# Patient Record
Sex: FEMALE | Race: WHITE | NOT HISPANIC OR LATINO
[De-identification: names, ages, dates, MRNs, and addresses within clinical notes are randomized per-mention and may not be internally consistent; named-entity substitution may affect disease eponyms.]

---

## 2019-10-07 ENCOUNTER — APPOINTMENT (OUTPATIENT)
Dept: ORTHOPEDIC SURGERY | Facility: CLINIC | Age: 24
End: 2019-10-07
Payer: COMMERCIAL

## 2019-10-07 VITALS — HEIGHT: 62 IN | WEIGHT: 120 LBS | BODY MASS INDEX: 22.08 KG/M2

## 2019-10-07 DIAGNOSIS — M23.91 UNSPECIFIED INTERNAL DERANGEMENT OF RIGHT KNEE: ICD-10-CM

## 2019-10-07 PROBLEM — Z00.00 ENCOUNTER FOR PREVENTIVE HEALTH EXAMINATION: Status: ACTIVE | Noted: 2019-10-07

## 2019-10-07 PROCEDURE — 99204 OFFICE O/P NEW MOD 45 MIN: CPT

## 2019-10-07 PROCEDURE — 73562 X-RAY EXAM OF KNEE 3: CPT | Mod: RT

## 2019-10-07 NOTE — DISCUSSION/SUMMARY
[de-identified] : This patient has a patellofemoral syndrome of her right knee due to the external rotation of her right hip. I recommend that she stretch and consider physical therapy she'll use ice she'll give it time if it doesn't get better she'll let me know it may take 6 or 8 weeks.

## 2019-10-07 NOTE — HISTORY OF PRESENT ILLNESS
[de-identified] : Location: right knee\par Quality:  sharp\par Duration: 10/01/2019\par Context: Atraumatic\par Aggravating Factors: walking, weightbearing,downstairs \par Conservative treatment: Rest, otc\par Associated Symptoms:  stiffness\par Prior Studies:n.a\par

## 2019-10-07 NOTE — PHYSICAL EXAM
[de-identified] : Right knee shows no warmth or swelling with full range of motion. There really isn't tenderness on palpation. Negative Silvano neurovascular exam so no instability.\par \par Right hip internal rotation is 10° and about 75° of external rotation left hip shows 30° of internal rotation 45° of external rotation. [de-identified] : Right knee x-ray shows no acute abnormalities

## 2020-11-27 ENCOUNTER — RESULT REVIEW (OUTPATIENT)
Age: 25
End: 2020-11-27

## 2021-03-11 ENCOUNTER — EMERGENCY (EMERGENCY)
Facility: HOSPITAL | Age: 26
LOS: 1 days | Discharge: HOME | End: 2021-03-11
Admitting: EMERGENCY MEDICINE
Payer: COMMERCIAL

## 2021-03-11 VITALS
WEIGHT: 125 LBS | HEIGHT: 62 IN | HEART RATE: 99 BPM | OXYGEN SATURATION: 97 % | SYSTOLIC BLOOD PRESSURE: 130 MMHG | RESPIRATION RATE: 16 BRPM | TEMPERATURE: 98 F | DIASTOLIC BLOOD PRESSURE: 70 MMHG

## 2021-03-11 DIAGNOSIS — Z77.29 CONTACT WITH AND (SUSPECTED) EXPOSURE TO OTHER HAZARDOUS SUBSTANCES: ICD-10-CM

## 2021-03-11 DIAGNOSIS — R51.9 HEADACHE, UNSPECIFIED: ICD-10-CM

## 2021-03-11 PROCEDURE — 99284 EMERGENCY DEPT VISIT MOD MDM: CPT

## 2021-03-11 NOTE — ED ADULT TRIAGE NOTE - CHIEF COMPLAINT QUOTE
headache/feeling off- vomited x 1 - refered to ed by poison control for blood test for potential CO poisoning

## 2021-03-12 LAB — COHGB MFR BLDV: 1.2 % — SIGNIFICANT CHANGE UP

## 2021-03-12 NOTE — ED PROVIDER NOTE - PATIENT PORTAL LINK FT
You can access the FollowMyHealth Patient Portal offered by [facility] by registering at the following website: [email or web address]. By joining [facility] FollowMyHealth portal, you will also be able to view your health information using other applications (apps) compatible with our system.

## 2021-03-12 NOTE — ED PROVIDER NOTE - NSFOLLOWUPINSTRUCTIONS_ED_ALL_ED_FT
Consider staying in different residence until CO issue gets resolved    Return to the Emergency Department for any concerning or worsening symptoms
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
## 2021-12-17 ENCOUNTER — TRANSCRIPTION ENCOUNTER (OUTPATIENT)
Age: 26
End: 2021-12-17